# Patient Record
Sex: FEMALE | Race: WHITE | NOT HISPANIC OR LATINO | URBAN - METROPOLITAN AREA
[De-identification: names, ages, dates, MRNs, and addresses within clinical notes are randomized per-mention and may not be internally consistent; named-entity substitution may affect disease eponyms.]

---

## 2017-08-17 ENCOUNTER — APPOINTMENT (OUTPATIENT)
Dept: WOMENS IMAGING | Facility: HOSPITAL | Age: 42
End: 2017-08-17

## 2017-08-17 PROCEDURE — 77067 SCR MAMMO BI INCL CAD: CPT | Performed by: RADIOLOGY

## 2018-08-31 ENCOUNTER — APPOINTMENT (OUTPATIENT)
Dept: WOMENS IMAGING | Facility: HOSPITAL | Age: 43
End: 2018-08-31

## 2018-08-31 PROCEDURE — 77067 SCR MAMMO BI INCL CAD: CPT | Performed by: RADIOLOGY

## 2019-08-30 ENCOUNTER — TRANSCRIBE ORDERS (OUTPATIENT)
Dept: ADMINISTRATIVE | Facility: HOSPITAL | Age: 44
End: 2019-08-30

## 2019-08-30 DIAGNOSIS — S83.242A ACUTE MEDIAL MENISCUS TEAR OF LEFT KNEE, INITIAL ENCOUNTER: Primary | ICD-10-CM

## 2019-09-04 ENCOUNTER — HOSPITAL ENCOUNTER (OUTPATIENT)
Dept: NUCLEAR MEDICINE | Facility: HOSPITAL | Age: 44
Discharge: HOME | End: 2019-09-04

## 2019-09-04 DIAGNOSIS — S83.242A ACUTE MEDIAL MENISCUS TEAR OF LEFT KNEE, INITIAL ENCOUNTER: ICD-10-CM

## 2019-09-04 PROCEDURE — 78315 BONE IMAGING 3 PHASE: CPT

## 2019-09-04 PROCEDURE — 0 TECHNETIUM MEDRONATE KIT: Performed by: ORTHOPAEDIC SURGERY

## 2019-09-04 PROCEDURE — A9503 TC99M MEDRONATE: HCPCS | Performed by: ORTHOPAEDIC SURGERY

## 2019-09-04 RX ORDER — TC 99M MEDRONATE 20 MG/10ML
24 INJECTION, POWDER, LYOPHILIZED, FOR SOLUTION INTRAVENOUS
Status: COMPLETED | OUTPATIENT
Start: 2019-09-04 | End: 2019-09-04

## 2019-09-04 RX ADMIN — Medication 24 MILLICURIE: at 12:02

## 2019-10-18 ENCOUNTER — APPOINTMENT (OUTPATIENT)
Dept: WOMENS IMAGING | Facility: HOSPITAL | Age: 44
End: 2019-10-18

## 2019-10-18 PROCEDURE — 77067 SCR MAMMO BI INCL CAD: CPT | Performed by: RADIOLOGY

## 2020-12-18 ENCOUNTER — APPOINTMENT (OUTPATIENT)
Dept: WOMENS IMAGING | Facility: HOSPITAL | Age: 45
End: 2020-12-18

## 2020-12-29 ENCOUNTER — IMMUNIZATION (OUTPATIENT)
Dept: VACCINE CLINIC | Facility: HOSPITAL | Age: 45
End: 2020-12-29

## 2020-12-29 PROCEDURE — 0011A: CPT | Performed by: OBSTETRICS & GYNECOLOGY

## 2020-12-29 PROCEDURE — 91301 HC SARSCO02 VAC 100MCG/0.5ML IM: CPT | Performed by: OBSTETRICS & GYNECOLOGY

## 2020-12-30 ENCOUNTER — APPOINTMENT (OUTPATIENT)
Dept: WOMENS IMAGING | Facility: HOSPITAL | Age: 45
End: 2020-12-30

## 2021-01-27 ENCOUNTER — IMMUNIZATION (OUTPATIENT)
Dept: VACCINE CLINIC | Facility: HOSPITAL | Age: 46
End: 2021-01-27

## 2021-01-27 PROCEDURE — 0012A: CPT | Performed by: OBSTETRICS & GYNECOLOGY

## 2021-01-27 PROCEDURE — 91301 HC SARSCO02 VAC 100MCG/0.5ML IM: CPT | Performed by: OBSTETRICS & GYNECOLOGY

## 2022-04-29 ENCOUNTER — APPOINTMENT (OUTPATIENT)
Dept: WOMENS IMAGING | Facility: HOSPITAL | Age: 47
End: 2022-04-29

## 2022-04-29 PROCEDURE — 77063 BREAST TOMOSYNTHESIS BI: CPT | Performed by: RADIOLOGY

## 2022-04-29 PROCEDURE — 77067 SCR MAMMO BI INCL CAD: CPT | Performed by: RADIOLOGY

## 2022-05-19 ENCOUNTER — OFFICE VISIT (OUTPATIENT)
Dept: GASTROENTEROLOGY | Facility: CLINIC | Age: 47
End: 2022-05-19

## 2022-05-19 ENCOUNTER — TELEPHONE (OUTPATIENT)
Dept: GASTROENTEROLOGY | Facility: CLINIC | Age: 47
End: 2022-05-19

## 2022-05-19 VITALS
HEART RATE: 54 BPM | BODY MASS INDEX: 23.7 KG/M2 | WEIGHT: 138.8 LBS | TEMPERATURE: 97.7 F | OXYGEN SATURATION: 99 % | DIASTOLIC BLOOD PRESSURE: 83 MMHG | HEIGHT: 64 IN | SYSTOLIC BLOOD PRESSURE: 123 MMHG

## 2022-05-19 DIAGNOSIS — R19.4 CHANGE IN BOWEL HABITS: ICD-10-CM

## 2022-05-19 DIAGNOSIS — R10.13 DYSPEPSIA: ICD-10-CM

## 2022-05-19 DIAGNOSIS — K90.49 FOOD INTOLERANCE: ICD-10-CM

## 2022-05-19 DIAGNOSIS — R14.0 BLOATING: Primary | ICD-10-CM

## 2022-05-19 PROCEDURE — 99203 OFFICE O/P NEW LOW 30 MIN: CPT | Performed by: INTERNAL MEDICINE

## 2022-05-19 RX ORDER — OMEPRAZOLE 20 MG/1
20 CAPSULE, DELAYED RELEASE ORAL DAILY
COMMUNITY
Start: 2022-04-15

## 2022-05-19 NOTE — TELEPHONE ENCOUNTER
OZZY patient in office for EGD/CS. Scheduled 08/05/2022 with arrival time 10:45am. Prep packet handed to patient. Also advised arrival time may vary based on [facility] guidelines. OZZY Escobar--Ronit

## 2022-08-03 ENCOUNTER — LAB (OUTPATIENT)
Dept: LAB | Facility: HOSPITAL | Age: 47
End: 2022-08-03

## 2022-08-03 DIAGNOSIS — K90.49 FOOD INTOLERANCE: ICD-10-CM

## 2022-08-03 DIAGNOSIS — R14.0 BLOATING: Primary | ICD-10-CM

## 2022-08-03 DIAGNOSIS — R10.13 DYSPEPSIA: ICD-10-CM

## 2022-08-03 DIAGNOSIS — R19.4 CHANGE IN BOWEL HABITS: ICD-10-CM

## 2022-08-03 LAB — SARS-COV-2 RNA PNL SPEC NAA+PROBE: NOT DETECTED

## 2022-08-03 PROCEDURE — 87635 SARS-COV-2 COVID-19 AMP PRB: CPT | Performed by: INTERNAL MEDICINE

## 2022-08-04 RX ORDER — CHLORAL HYDRATE 500 MG
CAPSULE ORAL
COMMUNITY

## 2022-08-04 RX ORDER — ASCORBIC ACID 250 MG
250 TABLET ORAL DAILY
COMMUNITY

## 2022-08-05 ENCOUNTER — ANESTHESIA (OUTPATIENT)
Dept: GASTROENTEROLOGY | Facility: HOSPITAL | Age: 47
End: 2022-08-05

## 2022-08-05 ENCOUNTER — ANESTHESIA EVENT (OUTPATIENT)
Dept: GASTROENTEROLOGY | Facility: HOSPITAL | Age: 47
End: 2022-08-05

## 2022-08-05 ENCOUNTER — HOSPITAL ENCOUNTER (OUTPATIENT)
Facility: HOSPITAL | Age: 47
Setting detail: HOSPITAL OUTPATIENT SURGERY
Discharge: HOME | End: 2022-08-05
Attending: INTERNAL MEDICINE | Admitting: INTERNAL MEDICINE

## 2022-08-05 ENCOUNTER — TELEPHONE (OUTPATIENT)
Dept: GASTROENTEROLOGY | Facility: CLINIC | Age: 47
End: 2022-08-05

## 2022-08-05 VITALS
OXYGEN SATURATION: 97 % | DIASTOLIC BLOOD PRESSURE: 73 MMHG | TEMPERATURE: 98.9 F | RESPIRATION RATE: 16 BRPM | HEART RATE: 63 BPM | SYSTOLIC BLOOD PRESSURE: 108 MMHG

## 2022-08-05 DIAGNOSIS — K90.49 FOOD INTOLERANCE: ICD-10-CM

## 2022-08-05 DIAGNOSIS — R14.0 BLOATING: ICD-10-CM

## 2022-08-05 DIAGNOSIS — R10.13 DYSPEPSIA: ICD-10-CM

## 2022-08-05 DIAGNOSIS — R19.4 CHANGE IN BOWEL HABITS: ICD-10-CM

## 2022-08-05 LAB
B-HCG UR QL: NEGATIVE
EXPIRATION DATE: NORMAL
INTERNAL NEGATIVE CONTROL: NORMAL
INTERNAL POSITIVE CONTROL: NORMAL
Lab: NORMAL

## 2022-08-05 PROCEDURE — 45378 DIAGNOSTIC COLONOSCOPY: CPT | Performed by: INTERNAL MEDICINE

## 2022-08-05 PROCEDURE — 43239 EGD BIOPSY SINGLE/MULTIPLE: CPT | Performed by: INTERNAL MEDICINE

## 2022-08-05 PROCEDURE — 25010000002 PROPOFOL 10 MG/ML EMULSION: Performed by: STUDENT IN AN ORGANIZED HEALTH CARE EDUCATION/TRAINING PROGRAM

## 2022-08-05 PROCEDURE — 88305 TISSUE EXAM BY PATHOLOGIST: CPT | Performed by: INTERNAL MEDICINE

## 2022-08-05 PROCEDURE — 81025 URINE PREGNANCY TEST: CPT | Performed by: INTERNAL MEDICINE

## 2022-08-05 PROCEDURE — S0260 H&P FOR SURGERY: HCPCS | Performed by: INTERNAL MEDICINE

## 2022-08-05 RX ORDER — GLYCOPYRROLATE 0.2 MG/ML
INJECTION INTRAMUSCULAR; INTRAVENOUS AS NEEDED
Status: DISCONTINUED | OUTPATIENT
Start: 2022-08-05 | End: 2022-08-05 | Stop reason: SURG

## 2022-08-05 RX ORDER — PROPOFOL 10 MG/ML
VIAL (ML) INTRAVENOUS AS NEEDED
Status: DISCONTINUED | OUTPATIENT
Start: 2022-08-05 | End: 2022-08-05 | Stop reason: SURG

## 2022-08-05 RX ORDER — SODIUM CHLORIDE, SODIUM LACTATE, POTASSIUM CHLORIDE, CALCIUM CHLORIDE 600; 310; 30; 20 MG/100ML; MG/100ML; MG/100ML; MG/100ML
30 INJECTION, SOLUTION INTRAVENOUS CONTINUOUS PRN
Status: DISCONTINUED | OUTPATIENT
Start: 2022-08-05 | End: 2022-08-05 | Stop reason: HOSPADM

## 2022-08-05 RX ORDER — PROPOFOL 10 MG/ML
VIAL (ML) INTRAVENOUS CONTINUOUS PRN
Status: DISCONTINUED | OUTPATIENT
Start: 2022-08-05 | End: 2022-08-05 | Stop reason: SURG

## 2022-08-05 RX ORDER — LIDOCAINE HYDROCHLORIDE 20 MG/ML
INJECTION, SOLUTION INFILTRATION; PERINEURAL AS NEEDED
Status: DISCONTINUED | OUTPATIENT
Start: 2022-08-05 | End: 2022-08-05 | Stop reason: SURG

## 2022-08-05 RX ADMIN — Medication 20 MG: at 12:58

## 2022-08-05 RX ADMIN — Medication 20 MG: at 12:59

## 2022-08-05 RX ADMIN — Medication 30 MG: at 13:00

## 2022-08-05 RX ADMIN — Medication 30 MG: at 12:52

## 2022-08-05 RX ADMIN — GLYCOPYRROLATE 0.2 MG: 0.2 INJECTION INTRAMUSCULAR; INTRAVENOUS at 12:49

## 2022-08-05 RX ADMIN — SODIUM CHLORIDE, POTASSIUM CHLORIDE, SODIUM LACTATE AND CALCIUM CHLORIDE 30 ML/HR: 600; 310; 30; 20 INJECTION, SOLUTION INTRAVENOUS at 11:29

## 2022-08-05 RX ADMIN — PROPOFOL 200 MCG/KG/MIN: 10 INJECTION, EMULSION INTRAVENOUS at 12:52

## 2022-08-05 RX ADMIN — LIDOCAINE HYDROCHLORIDE 60 MG: 20 INJECTION, SOLUTION INFILTRATION; PERINEURAL at 12:52

## 2022-08-05 NOTE — TELEPHONE ENCOUNTER
----- Message from Ariana Vee sent at 8/4/2022 11:20 AM EDT -----  Regarding: My Chart Prep- PROCEDURE TOMORROW  Pt needs colonoscopy prep uploaded to My Chart for her procedure tomorrow. Thanks!

## 2022-08-05 NOTE — PROGRESS NOTES
Chief Complaint   Patient presents with   • Abdominal Pain   • Heartburn   • Constipation   • Bloated   • mucus in stool     Susy Plunkett is a 47 y.o. female who presents with a change in bowel habits with constipation and mucus in the stools with bloating and heartburn with abdominal pain.  HPI     Patient 47-year-old female with no significant past medical history presenting with dyspepsia and bloating change in bowel habits.  Patient noted with constipation mucus in the stool here for evaluation.  Patient denies weight loss no family history of colorectal cancer or polyps.  Patient otherwise doing well.    Past Medical History:   Diagnosis Date   • Bloating    • COVID-19 virus infection 06/09/2022   • Dyspepsia    • GERD (gastroesophageal reflux disease)      No current facility-administered medications for this visit.  No current outpatient medications on file.    Facility-Administered Medications Ordered in Other Visits:   •  lactated ringers infusion, 30 mL/hr, Intravenous, Continuous PRN, Micah Keith MD, Last Rate: 30 mL/hr at 08/05/22 1129, 30 mL/hr at 08/05/22 1129  Allergies   Allergen Reactions   • Sulfa Antibiotics Hives     Social History     Socioeconomic History   • Marital status:    Tobacco Use   • Smoking status: Never Smoker   • Smokeless tobacco: Never Used   Vaping Use   • Vaping Use: Never used   Substance and Sexual Activity   • Alcohol use: Yes     Comment: social   • Drug use: Never   • Sexual activity: Defer     Family History   Problem Relation Age of Onset   • Pancreatic cancer Maternal Uncle    • Pancreatic cancer Cousin    • Malig Hyperthermia Neg Hx      Review of Systems   Constitutional: Negative.    HENT: Negative.    Eyes: Negative.    Respiratory: Negative.    Cardiovascular: Negative.    Gastrointestinal: Positive for abdominal distention, abdominal pain and constipation. Negative for anal bleeding, blood in stool, diarrhea, nausea, rectal pain and vomiting.    Endocrine: Negative.    Musculoskeletal: Negative.    Skin: Negative.    Allergic/Immunologic: Negative.    Hematological: Negative.      Vitals:    05/19/22 1626   BP: 123/83   Pulse: 54   Temp: 97.7 °F (36.5 °C)   SpO2: 99%     Physical Exam  Vitals and nursing note reviewed.   Constitutional:       Appearance: Normal appearance. She is well-developed and normal weight.   HENT:      Head: Normocephalic and atraumatic.   Eyes:      General: No scleral icterus.     Pupils: Pupils are equal, round, and reactive to light.   Cardiovascular:      Rate and Rhythm: Normal rate and regular rhythm.      Heart sounds: Normal heart sounds. No murmur heard.    No friction rub. No gallop.   Pulmonary:      Effort: Pulmonary effort is normal.      Breath sounds: Normal breath sounds. No wheezing or rales.   Abdominal:      General: Bowel sounds are normal. There is no distension or abdominal bruit.      Palpations: Abdomen is soft. Abdomen is not rigid. There is no shifting dullness, fluid wave, mass or pulsatile mass.      Tenderness: There is no abdominal tenderness. There is no guarding.      Hernia: No hernia is present.   Musculoskeletal:         General: No swelling or tenderness. Normal range of motion.   Skin:     General: Skin is warm and dry.      Coloration: Skin is not jaundiced.      Findings: No rash.   Neurological:      General: No focal deficit present.      Mental Status: She is alert and oriented to person, place, and time.      Cranial Nerves: No cranial nerve deficit.   Psychiatric:         Behavior: Behavior normal.         Thought Content: Thought content normal.       Diagnoses and all orders for this visit:    Bloating  -     Case Request; Standing  -     Case Request  -     COVID PRE-OP / PRE-PROCEDURE SCREENING ORDER (NO ISOLATION) - Swab, Nasopharynx; Future    Change in bowel habits  -     Case Request; Standing  -     Case Request  -     COVID PRE-OP / PRE-PROCEDURE SCREENING ORDER (NO ISOLATION)  - Swab, Nasopharynx; Future    Dyspepsia  -     Case Request; Standing  -     Case Request  -     COVID PRE-OP / PRE-PROCEDURE SCREENING ORDER (NO ISOLATION) - Swab, Nasopharynx; Future    Food intolerance  -     Case Request; Standing  -     Case Request  -     COVID PRE-OP / PRE-PROCEDURE SCREENING ORDER (NO ISOLATION) - Swab, Nasopharynx; Future    Other orders  -     omeprazole (priLOSEC) 20 MG capsule; Take 20 mg by mouth Daily.  -     cimetidine (TAGAMET) 200 MG tablet; Take 200 mg by mouth 4 (Four) Times a Day.  -     Probiotic Product (PROBIOTIC-10 PO); Take  by mouth.    Patient 47-year-old female with history of dyspepsia and bloating status post cosmetic surgery in the past presenting with change in bowel habits constipation mucus in the stools and recurrent heartburn.  Patient denies nausea vomiting no melena or bright red blood per rectum.  No weight loss.  Review above would recommend proceeding with EGD and colonoscopy.  We will follow-up clinically based on findings but continue PPI and probiotics pending results of endoscopy.

## 2022-08-05 NOTE — ANESTHESIA PREPROCEDURE EVALUATION
Anesthesia Evaluation     history of anesthetic complications (takes less anesthesia):               Airway   Mallampati: I  TM distance: >3 FB  Neck ROM: full  Dental - normal exam     Pulmonary    (-) shortness of breath, recent URI, not a smoker  Cardiovascular     (+) dysrhythmias (sinus arrthymia, normally bradycardic),   (-) hypertension, hyperlipidemia      Neuro/Psych  GI/Hepatic/Renal/Endo    (+)  GERD,    (-) liver disease, no renal disease    Musculoskeletal     Abdominal    Substance History      OB/GYN          Other                        Anesthesia Plan    ASA 1     MAC     intravenous induction     Anesthetic plan, risks, benefits, and alternatives have been provided, discussed and informed consent has been obtained with: patient.        CODE STATUS:

## 2022-08-05 NOTE — H&P
[facility]  Pre Procedure History & Physical    Chief Complaint:   Bloating, change in bowel habits with dyspepsia    Subjective     HPI:   Patient 47-year-old female with history of GERD complaining of bloating and dyspepsia with change in bowel habits constipation and mucus in the stool intermittently here for EGD and colonoscopy.  Patient does report since being seen in May his symptoms have improved.    Past Medical History:   Past Medical History:   Diagnosis Date   • Bloating    • COVID-19 virus infection 06/09/2022   • Dyspepsia    • GERD (gastroesophageal reflux disease)        Past Surgical History:  Past Surgical History:   Procedure Laterality Date   • COSMETIC SURGERY Bilateral 2009    breasts   • KNEE ACL RECONSTRUCTION     • WRIST SURGERY         Family History:  Family History   Problem Relation Age of Onset   • Pancreatic cancer Maternal Uncle    • Pancreatic cancer Cousin    • Malig Hyperthermia Neg Hx        Social History:   reports that she has never smoked. She has never used smokeless tobacco. She reports current alcohol use. She reports that she does not use drugs.    Medications:   Medications Prior to Admission   Medication Sig Dispense Refill Last Dose   • ascorbic acid (VITAMIN C) 250 MG tablet Take 250 mg by mouth Daily.   Past Week at Unknown time   • cimetidine (TAGAMET) 200 MG tablet Take 200 mg by mouth 4 (Four) Times a Day.   Past Month at Unknown time   • L-Lysine 1000 MG tablet Take  by mouth.   Past Week at Unknown time   • Omega-3 Fatty Acids (fish oil) 1000 MG capsule capsule Take  by mouth.   Past Month at Unknown time   • omeprazole (priLOSEC) 20 MG capsule Take 20 mg by mouth Daily.   Past Month at Unknown time   • Probiotic Product (PROBIOTIC-10 PO) Take  by mouth.   Past Week at Unknown time       Allergies:  Sulfa antibiotics    ROS:    Pertinent items are noted in HPI     Objective     Blood pressure 137/78, pulse 53, temperature 98.9 °F (37.2  °C), temperature source Oral, resp. rate 16, SpO2 99 %.    Physical Exam   Constitutional: Pt is oriented to person, place, and time and well-developed, well-nourished, and in no distress.   Mouth/Throat: Oropharynx is clear and moist.   Neck: Normal range of motion.   Cardiovascular: Normal rate, regular rhythm and normal heart sounds.    Pulmonary/Chest: Effort normal and breath sounds normal.   Abdominal: Soft. Nontender  Skin: Skin is warm and dry.   Psychiatric: Mood, memory, affect and judgment normal.     Assessment & Plan     Diagnosis:  Bloating  GERD  Constipation  Mucus in the stool    Anticipated Surgical Procedure:  EGD and colonoscopy    The risks, benefits, and alternatives of this procedure have been discussed with the patient or the responsible party- the patient understands and agrees to proceed.

## 2022-08-05 NOTE — BRIEF OP NOTE
COLONOSCOPY, ESOPHAGOGASTRODUODENOSCOPY  Progress Note    Susy Plunkett  8/5/2022    Pre-op Diagnosis:   Bloating [R14.0]  Change in bowel habits [R19.4]  Dyspepsia [R10.13]  Food intolerance [K90.49]       Post-Op Diagnosis Codes:     * Bloating [R14.0]     * Change in bowel habits [R19.4]     * Dyspepsia [R10.13]     * Food intolerance [K90.49]     * Internal hemorrhoids [K64.8]     * Hiatal hernia [K44.9]     * Gastritis [K29.70]    Procedure/CPT® Codes:        Procedure(s):  COLONOSCOPY TO CECUM  ESOPHAGOGASTRODUODENOSCOPY WITH BIOPSIES    Surgeon(s):  Micah Keith MD    Anesthesia: Monitored Anesthesia Care    Staff:   Endo Technician: Elva Lloyd RN  Endo Nurse: Ozzy Wise RN         Estimated Blood Loss: minimal    Urine Voided: * No values recorded between 8/5/2022 12:48 PM and 8/5/2022  1:23 PM *    Specimens:                Specimens     ID Source Type Tests Collected By Collected At Frozen?    A Gastric, Antrum Tissue · TISSUE PATHOLOGY EXAM   Micah Keith MD 8/5/22 1322     This specimen was not marked as sent.                Drains: * No LDAs found *    Findings: Colonoscopy to the cecum and ileocecal valve with normal colonic mucosa throughout 1+ internal hemorrhoids were seen.  EGD with antral gastritis biopsies taken.  Small sliding hiatal hernia with normal esophageal and duodenal mucosa.        Complications: None          Micah Keith MD     Date: 8/5/2022  Time: 13:27 EDT

## 2022-08-05 NOTE — ANESTHESIA POSTPROCEDURE EVALUATION
Patient: Susy Plunkett    Procedure Summary     Date: 08/05/22 Room / Location:  [facility]    Anesthesia Start: 1247 Anesthesia Stop: 1334    Procedures:       COLONOSCOPY TO CECUM (N/A )      ESOPHAGOGASTRODUODENOSCOPY WITH BIOPSIES (N/A Esophagus) Diagnosis:       Bloating      Change in bowel habits      Dyspepsia      Food intolerance      Internal hemorrhoids      Hiatal hernia      Gastritis      (Bloating [R14.0])      (Change in bowel habits [R19.4])      (Dyspepsia [R10.13])      (Food intolerance [K90.49])    Surgeons: Micah Keith MD Provider: Paul Roberts MD    Anesthesia Type: MAC ASA Status: 1          Anesthesia Type: MAC    Vitals  Vitals Value Taken Time   /73 08/05/22 1352   Temp     Pulse 63 08/05/22 1352   Resp 16 08/05/22 1352   SpO2 97 % 08/05/22 1352           Post Anesthesia Care and Evaluation    Patient location during evaluation: bedside  Patient participation: complete - patient participated  Level of consciousness: awake and alert  Pain management: adequate    Airway patency: patent  Anesthetic complications: No anesthetic complications    Cardiovascular status: acceptable  Respiratory status: acceptable  Hydration status: acceptable    Comments: /73 (BP Location: Left arm, Patient Position: Lying)   Pulse 63   Temp 37.2 °C (98.9 °F) (Oral)   Resp 16   SpO2 97%

## 2022-08-08 LAB
LAB AP CASE REPORT: NORMAL
PATH REPORT.FINAL DX SPEC: NORMAL
PATH REPORT.GROSS SPEC: NORMAL

## 2022-09-07 ENCOUNTER — TELEPHONE (OUTPATIENT)
Dept: GASTROENTEROLOGY | Facility: CLINIC | Age: 47
End: 2022-09-07

## 2022-09-07 NOTE — TELEPHONE ENCOUNTER
----- Message from Micah Keith MD sent at 8/21/2022  4:47 PM EDT -----  Biopsy with mild gastritis, if still symptomatic change ppi

## 2022-09-08 NOTE — TELEPHONE ENCOUNTER
Caller: Susy Plunkett    Relationship: Self    Best call back number: [phone]    What is the best time to reach you: ANYTIME     Who are you requesting to speak with (clinical staff, provider,  specific staff member): CLINICAL STAFF     Do you know the name of the person who called: PIERCE     Do you require a callback: YES

## 2022-09-09 ENCOUNTER — TELEPHONE (OUTPATIENT)
Dept: GASTROENTEROLOGY | Facility: CLINIC | Age: 47
End: 2022-09-09

## 2022-09-09 NOTE — TELEPHONE ENCOUNTER
Hub staff attempted to follow warm transfer process and was unsuccessful     Caller: Susy Plunkett    Relationship to patient: Self    Best call back number: [phone]    Patient is needing: RETURNED CALL TO NURSE FOR TEST RESULTS.

## 2022-09-09 NOTE — TELEPHONE ENCOUNTER
Called pt and advised of Dr. Yarbrough note.  Pt verbalized understanding.     Pt asked if she was tested for celiac.  Advised will send a msg to Dr Keith.

## 2025-03-03 ENCOUNTER — TELEPHONE (OUTPATIENT)
Dept: FAMILY MEDICINE CLINIC | Facility: CLINIC | Age: 50
End: 2025-03-03
Payer: COMMERCIAL

## 2025-03-03 NOTE — TELEPHONE ENCOUNTER
HUB TO RELAY:   Tried to call patient to schedule a new patient appointment.   Per Dr. Richter, ok to take patient on as a new patient.   Patient did not answer, LVM for her to return my call.

## (undated) DEVICE — SINGLE-USE BIOPSY FORCEPS: Brand: RADIAL JAW 4

## (undated) DEVICE — CANN O2 ETCO2 FITS ALL CONN CO2 SMPL A/ 7IN DISP LF

## (undated) DEVICE — TUBING, SUCTION, 1/4" X 10', STRAIGHT: Brand: MEDLINE

## (undated) DEVICE — SENSR O2 OXIMAX FNGR A/ 18IN NONSTR

## (undated) DEVICE — ADAPT CLN BIOGUARD AIR/H2O DISP

## (undated) DEVICE — BITEBLOCK OMNI BLOC

## (undated) DEVICE — KT ORCA ORCAPOD DISP STRL

## (undated) DEVICE — LN SMPL CO2 SHTRM SD STREAM W/M LUER